# Patient Record
Sex: FEMALE | Race: WHITE | Employment: UNEMPLOYED | URBAN - METROPOLITAN AREA
[De-identification: names, ages, dates, MRNs, and addresses within clinical notes are randomized per-mention and may not be internally consistent; named-entity substitution may affect disease eponyms.]

---

## 2023-11-07 ENCOUNTER — HOSPITAL ENCOUNTER (OUTPATIENT)
Dept: PHYSICAL THERAPY | Age: 63
Setting detail: THERAPIES SERIES
Discharge: HOME | End: 2023-11-07
Payer: COMMERCIAL

## 2023-11-07 PROCEDURE — 97140 MANUAL THERAPY 1/> REGIONS: CPT

## 2023-11-07 PROCEDURE — 97161 PT EVAL LOW COMPLEX 20 MIN: CPT

## 2023-11-07 NOTE — THERAPY EVALUATION
and plantar surface of foot (S1)     Medial aspect of posterior thigh (S2)     Perianal area (S3,4)            Range of Motion/Strength Testing     [x] All ROM and strength WNL except as marked below   * Denotes limitation by pain    Range Tested AROM PROM MMT/Resisted    Left Right Left Right Left Right   Hip Flexion         Hip Extension         Hip Abduction         Hip Adduction         Hip IR 10 0   4 4   Hip ER 20 20   4- 4-   Knee Flexion         Knee Extension         Ankle Dorsiflex         Ankle Plantarflex         Ankle Inversion         Ankle Eversion           Flexibility     [x] All flexibility WNL except as marked below  Muscle Left Right   Hamstrings (90/90) []  WNL  [] Tight  [] NT []  WNL  [] Tight  [] NT   Gastroc []  WNL  [] Tight  [] NT []  WNL  [] Tight  [] NT   TFL/ITB (Howie) []  WNL  [] Tight  [] NT []  WNL  [] Tight  [] NT   Iliopsoas (Willy) []  WNL  [] Tight  [] NT []  WNL  [] Tight  [] NT   Piriformis []  WNL  [] Tight  [] NT []  WNL  [] Tight  [] NT     Joint Mobility  Lumbar: Hypomobility lumbar spine  Hip: Hypomobility R hip joint  Knee: Ankle/Foot:    Reflexes/Trunk Strength    [x] All WNL except as marked below     Reflex Left Right Strength Strength   Quadriceps (L3,4)   Transv Abdominis    Achilles (S1,2)       Ankle clonus       Babinski         ASSESSMENT  Pt is a 67 y/o presenting to physical therapy with c/o R hip pain. R SIJ dysfunction and R hip joint hypomobility noted and improved with manual therapy. Weakness in B gluteals and movement impairments ERS lumbar spine and AGMR R hip. Recommend PT to address joint dysfunctions and correct movement impairments as they present 2x/wk for 6 weeks.      Body Structures, Functions, Activity Limitations Requiring Skilled Therapeutic Intervention: Decreased functional mobility ,Decreased ADL status,Decreased ROM,Decreased body mechanics,Decreased strength,Decreased balance,Increased pain     Statement of Medical Necessity:

## 2023-11-07 NOTE — FLOWSHEET NOTE
[address] [facility], [address] [place] [address]  Phone: [phone]  Fax [phone]     Physical Therapy: Daily Note   Patient: Tino Sneed (43 y.o. female)   Treatment Date: 2023   :  1960 MRN: [id number]   Visit #: 1   Auth needed? [x]  Yes    []  No   Amount authorized:   Visit Limit:  Insurance: Payor: Marielos Course / Plan: BCBS - [place] PPO / Product Type: *No Product type* /   Insurance ID: [id number] - ([facility])  Secondary Insurance (if applicable):    Treatment Diagnosis:    No diagnosis found. Medical Diagnosis: Primary osteoarthritis of right hip [M16.11]     Referring Physician: TERRIE Myers*    PCP: TERRIE Montoya - CNP   Plan of Care signed? []  Yes [x]  No  Latex Allergy? [] Y   [] N      Pacemaker? [] Y   [] N   Preferred Language for Healthcare:   [x] English       [] other:       RESTRICTIONS/PRECAUTIONS: Osteoporosis    Functional Outcome Measure/Scale:    Date Assessed 23     LEFS (%) 15%         SUBJECTIVE EXAMINATION   Pain level:  3-4/10    Patient Report/Comments:  Pt ran [place] in April and working out without issue. 23 insidious onset of R hip pain while working out at [employer]. By 23, pt went to ortho to figure out was going on with her hip. Severe OA in B hips, R worse than L. Myloxicam improved the pain. Limping and held running since. Saw PT 23 and diagnosed with SI joint dysfunction. Manual therapy has helped her pain significantly but it persists and has slowly worsened again. THR scheduled for October but had injection instead 10/13/23. Mild improvement with injection. R THR postponed to 2024. Hip pain currently, 3-4/10. Sitting and sleeping increase pain. Pain at worse 5-6/10. Pt feels like the hip and LE are going to 'buckle' lately. No issues with standing. Sit to stand is really painful.   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

## 2023-11-09 ENCOUNTER — HOSPITAL ENCOUNTER (OUTPATIENT)
Dept: PHYSICAL THERAPY | Age: 63
Setting detail: THERAPIES SERIES
Discharge: HOME | End: 2023-11-09
Payer: COMMERCIAL

## 2023-11-09 PROCEDURE — 97140 MANUAL THERAPY 1/> REGIONS: CPT

## 2023-11-09 PROCEDURE — 97112 NEUROMUSCULAR REEDUCATION: CPT

## 2023-11-09 PROCEDURE — 97110 THERAPEUTIC EXERCISES: CPT

## 2023-11-09 NOTE — FLOWSHEET NOTE
SIJ dysfunction and R hip joint hypomobility noted and improved with manual therapy. Weakness in B gluteals and movement impairments ERS lumbar spine and AGMR R hip. Treatment/Activity Tolerance:  [x] Patient tolerated treatment well [] Patient limited by fatigue  [] Patient limited by pain  [] Patient limited by other medical complications  [] Other:     Prognosis for POC:   [x] Good     [] Fair     [] Poor      Patient requires continued skilled intervention: [x] Yes       [] No    GOALS     Short term goal 1: Pt will be indep in HEP  Short term goal 2: Pt will decrease pain 25%  Short term goal 3: Pt will increase B hip strength to 4/5  Short term goal 4: Pt will return to walking for exercise without pain  Short term goal 5: Pt will return to 95% PLOF     Overall Progression Towards Functional goals/ Treatment Progress Update:  [] Patient is progressing as expected towards functional goals listed. [] Progression is slowed due to complexities/Impairments listed. [] Progression has been slowed due to co-morbidities.   [x] Plan just implemented, too soon (<30days) to assess goals progression   [] Goals require adjustment due to lack of progress  [] Patient is not progressing as expected and requires additional follow up with physician  [] Other:         Electronically Signed by Camilo Rivas, PT  Date: 11/09/2023

## 2023-11-14 ENCOUNTER — APPOINTMENT (OUTPATIENT)
Dept: PHYSICAL THERAPY | Age: 63
End: 2023-11-14
Payer: COMMERCIAL

## 2023-11-16 ENCOUNTER — APPOINTMENT (OUTPATIENT)
Dept: PHYSICAL THERAPY | Age: 63
End: 2023-11-16
Payer: COMMERCIAL

## 2023-11-28 ENCOUNTER — HOSPITAL ENCOUNTER (OUTPATIENT)
Dept: PHYSICAL THERAPY | Age: 63
Setting detail: THERAPIES SERIES
Discharge: HOME | End: 2023-11-28
Payer: COMMERCIAL

## 2023-11-28 PROCEDURE — 97140 MANUAL THERAPY 1/> REGIONS: CPT

## 2023-11-28 PROCEDURE — 97110 THERAPEUTIC EXERCISES: CPT

## 2023-11-28 PROCEDURE — 97112 NEUROMUSCULAR REEDUCATION: CPT

## 2023-11-28 NOTE — FLOWSHEET NOTE
700 [facility], [address] [place] [address]  Phone: [phone]  Fax [phone]     Physical Therapy: Daily Note   Patient: Senia Tello (7 y.o. female)   Treatment Date: 2023   :  1960 MRN: [id number]   Visit #: 3   Auth needed? [x]  Yes    []  No   Amount authorized:   Visit Limit:  Insurance: Payor: Jose Enrique / Plan: BCBS - [place] PPO / Product Type: *No Product type* /   Insurance ID: [id number] - ([address] [place] [address])  Secondary Insurance (if applicable):    Treatment Diagnosis:    No diagnosis found. Medical Diagnosis: Primary osteoarthritis of right hip [M16.11]     Referring Physician: TERRIE Miranda*    PCP: TERRIE Chaudhry - CNP   Plan of Care signed? []  Yes [x]  No  Latex Allergy? [] Y   [] N      Pacemaker? [] Y   [] N   Preferred Language for Healthcare:   [x] English       [] other:       RESTRICTIONS/PRECAUTIONS: Osteoporosis    Functional Outcome Measure/Scale:    Date Assessed 23     LEFS (%) 15%         SUBJECTIVE EXAMINATION   Pain level:  3-410    Patient Report/Comments:  Pt feels 2 steps forward and 1 back over the past week. Pt notes she walked 3 days in a row last week and felt 'almost completely crippled' and unable to bear weight on her R leg. At initial evaluation:  Pt ran [place] in April and working out without issue. 23 insidious onset of R hip pain while working out at [employer]. By 23, pt went to ortho to figure out was going on with her hip. Severe OA in B hips, R worse than L. Myloxicam improved the pain. Limping and held running since. Saw PT 23 and diagnosed with SI joint dysfunction. Manual therapy has helped her pain significantly but it persists and has slowly worsened again. THR scheduled for October but had injection instead 10/13/23. Mild improvement with injection. R THR postponed to 2024. Hip pain currently, 3-410.

## 2023-12-05 ENCOUNTER — HOSPITAL ENCOUNTER (OUTPATIENT)
Dept: PHYSICAL THERAPY | Age: 63
Setting detail: THERAPIES SERIES
Discharge: HOME | End: 2023-12-05
Payer: COMMERCIAL

## 2023-12-05 PROCEDURE — 97110 THERAPEUTIC EXERCISES: CPT

## 2023-12-05 PROCEDURE — 97140 MANUAL THERAPY 1/> REGIONS: CPT

## 2023-12-05 PROCEDURE — 97112 NEUROMUSCULAR REEDUCATION: CPT

## 2023-12-05 NOTE — FLOWSHEET NOTE
700 [facility] Therapy[address], [address] [place] [address]  Phone: [phone]  Fax [phone]     Physical Therapy: Daily Note   Patient: Constance Wooten (63 y.o. female)   Treatment Date: 2023   :  1960 MRN: [id number]   Visit #: 4   Auth needed? [x]  Yes    []  No   Amount authorized:   Visit Limit:  Insurance: Payor: Jhonatan Medina / Plan: BCBS - [place] PPO / Product Type: *No Product type* /   Insurance ID: [id number] - ([address] [place] [address])  Secondary Insurance (if applicable):    Treatment Diagnosis:    No diagnosis found. Medical Diagnosis: Primary osteoarthritis of right hip [M16.11]     Referring Physician: TERRIE Frank*    PCP: TERRIE Zarco - CNP   Plan of Care signed? []  Yes [x]  No  Latex Allergy? [] Y   [] N      Pacemaker? [] Y   [] N   Preferred Language for Healthcare:   [x] English       [] other:       RESTRICTIONS/PRECAUTIONS: Osteoporosis    Functional Outcome Measure/Scale:    Date Assessed 23     LEFS (%) 15%         SUBJECTIVE EXAMINATION   Pain level:  3-410    Patient Report/Comments:  Pt reports soreness in the hip since last session but improved after a couple of days. Significant soreness all over today from sitting with her dying mother the past couple of days. At initial evaluation:  Pt ran [place] in April and working out without issue. 23 insidious onset of R hip pain while working out at [email or web address]. By 23, pt went to ortho to figure out was going on with her hip. Severe OA in B hips, R worse than L. Myloxicam improved the pain. Limping and held running since. Saw PT 23 and diagnosed with SI joint dysfunction. Manual therapy has helped her pain significantly but it persists and has slowly worsened again. THR scheduled for October but had injection instead 10/13/23. Mild improvement with injection. R THR postponed to 2024.   Hip pain currently,

## 2023-12-28 ENCOUNTER — APPOINTMENT (OUTPATIENT)
Dept: PHYSICAL THERAPY | Age: 63
End: 2023-12-28
Payer: COMMERCIAL

## 2024-01-09 ENCOUNTER — HOSPITAL ENCOUNTER (OUTPATIENT)
Dept: PHYSICAL THERAPY | Age: 64
Setting detail: THERAPIES SERIES
Discharge: HOME | End: 2024-01-09
Payer: COMMERCIAL

## 2024-01-09 PROCEDURE — 97140 MANUAL THERAPY 1/> REGIONS: CPT

## 2024-01-09 PROCEDURE — 97112 NEUROMUSCULAR REEDUCATION: CPT

## 2024-01-09 NOTE — FLOWSHEET NOTE
[facility] - [facility] and [facility], [facility]  [address] [place] [address], [address]  [place]  [address]  Phone: [phone]  Fax [phone]     Physical Therapy: Daily Note   Patient: Magdalena Morel (63 y.o. female)   Treatment Date: 2024   :  1960 MRN: [id number]   Visit #: 6   Auth needed?    [x]  Yes    []  No   Amount authorized:   Visit Limit:  Insurance: Payor: BCBS / Plan: BCBS [place]PO / Product Type: *No Product type* /   Insurance ID: [id number] - ([facility])  Secondary Insurance (if applicable):    Treatment Diagnosis:    No diagnosis found.   Medical Diagnosis: Primary osteoarthritis of right hip [M16.11]     Referring Physician: Herlinda Cui APRN*    PCP: Radha Evangelista APRN - CNP   Plan of Care signed?    []  Yes [x]  No  Latex Allergy? [] Y   [] N      Pacemaker?  [] Y   [] N   Preferred Language for Healthcare:   [x] English       [] other:       RESTRICTIONS/PRECAUTIONS: Osteoporosis    Functional Outcome Measure/Scale:    Date Assessed 23     LEFS (%) 15% Assess NV        SUBJECTIVE EXAMINATION   Pain level:  2/10    Patient Report/Comments:  Pt reports travelling since last visit without increased pain.  No longer having pain with R LE WB.  Pt reports she purchased a TB and is using it regularly with good pain relief.           At initial evaluation:  Pt ran [employer] in April and working out without issue.  23 insidious onset of R hip pain while working out at [email or web address].  By 23, pt went to ortho to figure out was going on with her hip.  Severe OA in B hips, R worse than L.  Myloxicam improved the pain.  Limping and held running since.  Saw PT 23 and diagnosed with SI joint dysfunction.  Manual therapy has helped her pain significantly but it persists and has slowly worsened again.  THR scheduled for October but had injection instead 10/13/23.  Mild improvement with injection.  R THR postponed to 2024.  Hip pain

## 2024-01-10 ENCOUNTER — APPOINTMENT (OUTPATIENT)
Dept: PHYSICAL THERAPY | Age: 64
End: 2024-01-10
Payer: COMMERCIAL

## 2024-01-23 ENCOUNTER — HOSPITAL ENCOUNTER (OUTPATIENT)
Dept: PHYSICAL THERAPY | Age: 64
Setting detail: THERAPIES SERIES
Discharge: HOME | End: 2024-01-23
Payer: COMMERCIAL

## 2024-01-23 PROCEDURE — 97110 THERAPEUTIC EXERCISES: CPT

## 2024-01-23 PROCEDURE — 97140 MANUAL THERAPY 1/> REGIONS: CPT

## 2024-01-23 PROCEDURE — 97112 NEUROMUSCULAR REEDUCATION: CPT

## 2024-01-23 NOTE — FLOWSHEET NOTE
[facility] - Outpatient Rehabilitation and Therapy, [facility]  [address] [place] [address], [address]  [place]  [address]  Phone: [phone]  Fax [phone]     Physical Therapy: Daily Note   Patient: Magdalena Morel (63 y.o. female)   Treatment Date: 2024   :  1960 MRN: [id number]   Visit #: 7   Auth needed?    [x]  Yes    []  No   Amount authorized:   Visit Limit:  Insurance: Payor: BCBS / Plan: BCBS [place] / Product Type: *No Product type* /   Insurance ID: [id number] - ([facility])  Secondary Insurance (if applicable):    Treatment Diagnosis:    No diagnosis found.   Medical Diagnosis: Primary osteoarthritis of right hip [M16.11]     Referring Physician: Herlinda Cui APRN*    PCP: Radha Evangelista APRN - CNP   Plan of Care signed?    []  Yes [x]  No  Latex Allergy? [] Y   [] N      Pacemaker?  [] Y   [] N   Preferred Language for Healthcare:   [x] English       [] other:       RESTRICTIONS/PRECAUTIONS: Osteoporosis    Functional Outcome Measure/Scale:    Date Assessed 23    LEFS (%) 15% 26%        SUBJECTIVE EXAMINATION   Pain level:  2/10    Patient Report/Comments: Pt says symptoms fluctuate a lot. Bending over to put on shoes, washing left side in shower, getting in and out of a car, and other functional activities with hip mobility are difficult. Overall, not any worse since start of therapy. Improvements in activity and mild improvements in symptoms.     At initial evaluation:  Pt ran [employer] in April and working out without issue.  23 insidious onset of R hip pain while working out at [employer].  By 23, pt went to ortho to figure out was going on with her hip.  Severe OA in B hips, R worse than L.  Myloxicam improved the pain.  Limping and held running since.  Saw PT 23 and diagnosed with SI joint dysfunction.  Manual therapy has helped her pain significantly but it persists and has slowly worsened again.  THR scheduled for October but had 
Statement Selected